# Patient Record
Sex: FEMALE | Race: WHITE
[De-identification: names, ages, dates, MRNs, and addresses within clinical notes are randomized per-mention and may not be internally consistent; named-entity substitution may affect disease eponyms.]

---

## 2020-11-27 ENCOUNTER — TRANSCRIPTION ENCOUNTER (OUTPATIENT)
Age: 60
End: 2020-11-27

## 2021-01-06 ENCOUNTER — TRANSCRIPTION ENCOUNTER (OUTPATIENT)
Age: 61
End: 2021-01-06

## 2022-12-09 PROBLEM — Z00.00 ENCOUNTER FOR PREVENTIVE HEALTH EXAMINATION: Status: ACTIVE | Noted: 2022-12-09

## 2022-12-14 ENCOUNTER — APPOINTMENT (OUTPATIENT)
Dept: ORTHOPEDIC SURGERY | Facility: CLINIC | Age: 62
End: 2022-12-14

## 2022-12-14 VITALS — BODY MASS INDEX: 27.49 KG/M2 | HEIGHT: 65 IN | WEIGHT: 165 LBS

## 2022-12-14 DIAGNOSIS — R26.9 UNSPECIFIED ABNORMALITIES OF GAIT AND MOBILITY: ICD-10-CM

## 2022-12-14 DIAGNOSIS — S91.012A LACERATION W/OUT FOREIGN BODY, LEFT ANKLE, INITIAL ENCOUNTER: ICD-10-CM

## 2022-12-14 DIAGNOSIS — Z78.9 OTHER SPECIFIED HEALTH STATUS: ICD-10-CM

## 2022-12-14 DIAGNOSIS — S90.02XA CONTUSION OF LEFT ANKLE, INITIAL ENCOUNTER: ICD-10-CM

## 2022-12-14 PROCEDURE — 73630 X-RAY EXAM OF FOOT: CPT | Mod: LT

## 2022-12-14 PROCEDURE — 99072 ADDL SUPL MATRL&STAF TM PHE: CPT

## 2022-12-14 PROCEDURE — 73600 X-RAY EXAM OF ANKLE: CPT | Mod: LT

## 2022-12-14 PROCEDURE — 99203 OFFICE O/P NEW LOW 30 MIN: CPT

## 2022-12-14 NOTE — REASON FOR VISIT
[FreeTextEntry2] : left ankle pain. FELL THROUGH CRATE 10/18/2021 IN WORK AT [employer]..SEVERE LACERATION OF SHIN.  HAD WOUND CARE AND ANTIBIOTICS.  DOI 10/18/2021.

## 2022-12-14 NOTE — HISTORY OF PRESENT ILLNESS
[Left Leg] : left leg [Work related] : work related [Sudden] : sudden [8] : 8 [0] : 0 [Intermittent] : intermittent [Rest] : rest [Full time] : Work status: full time [] : no [FreeTextEntry3] : 10/18/2021 [FreeTextEntry5] : stepped onto crate to reach a box and fell through the crate  [FreeTextEntry6] : stretching sensation  [de-identified] :  - [employer]

## 2022-12-14 NOTE — WORK
[Other: ___] : [unfilled] [Was the competent medical cause of the injury] : was the competent medical cause of the injury [Are consistent with the injury] : are consistent with the injury [Consistent with my objective findings] : consistent with my objective findings [Mild Partial] : mild partial [Does not reveal pre-existing condition(s) that may affect treatment/prognosis] : does not reveal pre-existing condition(s) that may affect treatment/prognosis [Patient] : patient [Yes. explain: __________] : Yes: [unfilled] [No Rx restrictions] : No Rx restrictions. [I provided the services listed above] :  I provided the services listed above. [FreeTextEntry1] : excellent [FreeTextEntry3] : s densen

## 2022-12-14 NOTE — ASSESSMENT
[FreeTextEntry1] : I FEEL THAT PT WILL HELP THE PATIENT REDUCE SCAR PAIN AND CONTRACTURE. \par ALSO PT TO STRENGTHEN MUSCLES OF THE LOWER EXTREMITY.

## 2023-01-05 ENCOUNTER — FORM ENCOUNTER (OUTPATIENT)
Age: 63
End: 2023-01-05

## 2023-10-20 ENCOUNTER — OFFICE (OUTPATIENT)
Facility: LOCATION | Age: 63
Setting detail: OPHTHALMOLOGY
End: 2023-10-20
Payer: COMMERCIAL

## 2023-10-20 DIAGNOSIS — H10.433: ICD-10-CM

## 2023-10-20 DIAGNOSIS — H02.831: ICD-10-CM

## 2023-10-20 DIAGNOSIS — H16.253: ICD-10-CM

## 2023-10-20 DIAGNOSIS — H02.834: ICD-10-CM

## 2023-10-20 PROBLEM — H25.13 CATARACT NUCLEAR SCLEROSIS AGE RELATED; BOTH EYES: Status: ACTIVE | Noted: 2023-10-20

## 2023-10-20 PROCEDURE — 99213 OFFICE O/P EST LOW 20 MIN: CPT | Performed by: OPHTHALMOLOGY

## 2023-10-20 ASSESSMENT — CONFRONTATIONAL VISUAL FIELD TEST (CVF)
OS_FINDINGS: FULL
OD_FINDINGS: FULL

## 2023-10-20 ASSESSMENT — LID POSITION - DERMATOCHALASIS
OS_DERMATOCHALASIS: LUL
OD_DERMATOCHALASIS: RUL

## 2023-10-24 ASSESSMENT — VISUAL ACUITY
OD_BCVA: 20/50-2
OS_BCVA: 20/30-2

## 2024-07-10 ENCOUNTER — OFFICE (OUTPATIENT)
Facility: LOCATION | Age: 64
Setting detail: OPHTHALMOLOGY
End: 2024-07-10
Payer: COMMERCIAL

## 2024-07-10 ENCOUNTER — RX ONLY (RX ONLY)
Age: 64
End: 2024-07-10

## 2024-07-10 DIAGNOSIS — H04.563: ICD-10-CM

## 2024-07-10 DIAGNOSIS — H10.433: ICD-10-CM

## 2024-07-10 PROBLEM — H02.831 DERMATOCHALSIS; RIGHT UPPER LID, LEFT UPPER LID: Status: ACTIVE | Noted: 2024-07-10

## 2024-07-10 PROBLEM — H02.834 DERMATOCHALSIS; RIGHT UPPER LID, LEFT UPPER LID: Status: ACTIVE | Noted: 2024-07-10

## 2024-07-10 PROCEDURE — 99213 OFFICE O/P EST LOW 20 MIN: CPT | Performed by: OPHTHALMOLOGY

## 2024-07-10 ASSESSMENT — CONFRONTATIONAL VISUAL FIELD TEST (CVF)
OD_FINDINGS: FULL
OS_FINDINGS: FULL

## 2024-07-10 ASSESSMENT — LID POSITION - DERMATOCHALASIS
OD_DERMATOCHALASIS: RUL
OS_DERMATOCHALASIS: LUL

## 2024-07-12 ENCOUNTER — RX ONLY (RX ONLY)
Age: 64
End: 2024-07-12

## 2024-08-07 ENCOUNTER — OFFICE (OUTPATIENT)
Facility: LOCATION | Age: 64
Setting detail: OPHTHALMOLOGY
End: 2024-08-07
Payer: COMMERCIAL

## 2024-08-07 DIAGNOSIS — H04.563: ICD-10-CM

## 2024-08-07 DIAGNOSIS — H10.433: ICD-10-CM

## 2024-08-07 PROCEDURE — 99213 OFFICE O/P EST LOW 20 MIN: CPT | Performed by: OPHTHALMOLOGY

## 2024-08-07 ASSESSMENT — CONFRONTATIONAL VISUAL FIELD TEST (CVF)
OD_FINDINGS: FULL
OS_FINDINGS: FULL

## 2024-08-07 ASSESSMENT — LID POSITION - DERMATOCHALASIS
OD_DERMATOCHALASIS: RUL
OS_DERMATOCHALASIS: LUL

## 2024-10-09 ENCOUNTER — OFFICE (OUTPATIENT)
Facility: LOCATION | Age: 64
Setting detail: OPHTHALMOLOGY
End: 2024-10-09
Payer: COMMERCIAL

## 2024-10-09 ENCOUNTER — RX ONLY (RX ONLY)
Age: 64
End: 2024-10-09

## 2024-10-09 DIAGNOSIS — H04.563: ICD-10-CM

## 2024-10-09 DIAGNOSIS — H10.45: ICD-10-CM

## 2024-10-09 PROBLEM — H11.151 PINGUECULA; RIGHT EYE: Status: ACTIVE | Noted: 2024-10-09

## 2024-10-09 PROCEDURE — 99213 OFFICE O/P EST LOW 20 MIN: CPT | Performed by: OPHTHALMOLOGY

## 2024-10-09 ASSESSMENT — TONOMETRY
OS_IOP_MMHG: 19
OD_IOP_MMHG: 19

## 2024-10-09 ASSESSMENT — LID POSITION - DERMATOCHALASIS
OS_DERMATOCHALASIS: LUL
OD_DERMATOCHALASIS: RUL

## 2024-10-10 ASSESSMENT — VISUAL ACUITY
OD_BCVA: 20/40+2
OS_BCVA: 20/25-2

## 2025-01-15 ENCOUNTER — OFFICE (OUTPATIENT)
Facility: LOCATION | Age: 65
Setting detail: OPHTHALMOLOGY
End: 2025-01-15
Payer: COMMERCIAL

## 2025-01-15 DIAGNOSIS — H31.29: ICD-10-CM

## 2025-01-15 DIAGNOSIS — H02.834: ICD-10-CM

## 2025-01-15 DIAGNOSIS — H02.831: ICD-10-CM

## 2025-01-15 DIAGNOSIS — H25.13: ICD-10-CM

## 2025-01-15 PROCEDURE — 92014 COMPRE OPH EXAM EST PT 1/>: CPT | Performed by: OPHTHALMOLOGY

## 2025-01-15 ASSESSMENT — LID POSITION - DERMATOCHALASIS
OD_DERMATOCHALASIS: RUL
OS_DERMATOCHALASIS: LUL

## 2025-01-15 ASSESSMENT — CONFRONTATIONAL VISUAL FIELD TEST (CVF)
OS_FINDINGS: FULL
OD_FINDINGS: FULL

## 2025-01-15 ASSESSMENT — TONOMETRY
OD_IOP_MMHG: 20
OS_IOP_MMHG: 20

## 2025-01-15 ASSESSMENT — CORNEAL EDEMA CLINICAL DESCRIPTION: OS_CORNEALEDEMA: T

## 2025-01-21 PROBLEM — H31.29 PERIPAPILLARY ATROPHY: Status: ACTIVE | Noted: 2025-01-15

## 2025-01-21 ASSESSMENT — REFRACTION_CURRENTRX
OS_VPRISM_DIRECTION: SV
OD_AXIS: 162
OD_VPRISM_DIRECTION: SV
OS_CYLINDER: +1.00
OD_CYLINDER: +1.00
OS_SPHERE: +2.50
OS_AXIS: 028
OS_OVR_VA: 20/
OD_SPHERE: +2.75
OD_OVR_VA: 20/

## 2025-01-21 ASSESSMENT — VISUAL ACUITY
OD_BCVA: 20/30+3
OS_BCVA: 20/30+3

## 2025-04-04 ENCOUNTER — OFFICE (OUTPATIENT)
Facility: LOCATION | Age: 65
Setting detail: OPHTHALMOLOGY
End: 2025-04-04
Payer: COMMERCIAL

## 2025-04-04 DIAGNOSIS — H40.043: ICD-10-CM

## 2025-04-04 DIAGNOSIS — H04.563: ICD-10-CM

## 2025-04-04 DIAGNOSIS — H10.45: ICD-10-CM

## 2025-04-04 PROCEDURE — 99213 OFFICE O/P EST LOW 20 MIN: CPT | Performed by: OPHTHALMOLOGY

## 2025-04-04 ASSESSMENT — CONFRONTATIONAL VISUAL FIELD TEST (CVF)
OD_FINDINGS: FULL
OS_FINDINGS: FULL

## 2025-04-04 ASSESSMENT — LID EXAM ASSESSMENTS
OD_MEDIAL_FAT_PROLAPSE: 2+
OS_COMMENTS: 2+ LATERAL FAT PROLAPS
OS_LATERAL_FAT_PROLAPSE: 2+
OD_LATERAL_FAT_PROLAPSE: 2+
OS_PUNCTAL_STENOSIS: 1+
OD_COMMENTS: 2+ LATERAL FAT PROLAPS
OD_CENTRAL_FAT_PROLAPSE: 2+
OS_CENTRAL_FAT_PROLAPSE: 2+
OD_PUNCTAL_STENOSIS: 1+
OS_COMMENTS: 2+ MEDIAL FAT PROLAPSE

## 2025-04-04 ASSESSMENT — LID POSITION - DERMATOCHALASIS
OD_DERMATOCHALASIS: RUL
OS_DERMATOCHALASIS: LUL

## 2025-04-04 ASSESSMENT — CORNEAL EDEMA CLINICAL DESCRIPTION: OS_CORNEALEDEMA: T

## 2025-04-09 ASSESSMENT — REFRACTION_CURRENTRX
OS_VPRISM_DIRECTION: SV
OD_SPHERE: +2.75
OS_OVR_VA: 20/
OD_VPRISM_DIRECTION: SV
OD_CYLINDER: +1.00
OS_AXIS: 028
OS_CYLINDER: +1.00
OD_AXIS: 162
OS_SPHERE: +2.50
OD_OVR_VA: 20/

## 2025-04-09 ASSESSMENT — VISUAL ACUITY
OD_BCVA: 20/40-1
OS_BCVA: 20/30+2

## 2025-04-30 ENCOUNTER — OFFICE (OUTPATIENT)
Facility: LOCATION | Age: 65
Setting detail: OPHTHALMOLOGY
End: 2025-04-30
Payer: COMMERCIAL

## 2025-04-30 DIAGNOSIS — H40.043: ICD-10-CM

## 2025-04-30 DIAGNOSIS — H10.45: ICD-10-CM

## 2025-04-30 DIAGNOSIS — H04.563: ICD-10-CM

## 2025-04-30 PROCEDURE — 99213 OFFICE O/P EST LOW 20 MIN: CPT | Performed by: OPHTHALMOLOGY

## 2025-04-30 ASSESSMENT — TONOMETRY
OD_IOP_MMHG: 19
OS_IOP_MMHG: 19

## 2025-04-30 ASSESSMENT — CONFRONTATIONAL VISUAL FIELD TEST (CVF)
OD_FINDINGS: FULL
OS_FINDINGS: FULL

## 2025-05-01 ASSESSMENT — REFRACTION_CURRENTRX
OS_OVR_VA: 20/
OS_SPHERE: +2.50
OS_VPRISM_DIRECTION: SV
OS_CYLINDER: +1.00
OS_AXIS: 028
OD_CYLINDER: +1.00
OD_AXIS: 162
OD_SPHERE: +2.75
OD_OVR_VA: 20/
OD_VPRISM_DIRECTION: SV

## 2025-05-01 ASSESSMENT — VISUAL ACUITY
OD_BCVA: 20/25-1
OS_BCVA: 20/20-1

## 2025-06-04 ENCOUNTER — OFFICE (OUTPATIENT)
Facility: LOCATION | Age: 65
Setting detail: OPHTHALMOLOGY
End: 2025-06-04
Payer: COMMERCIAL

## 2025-06-04 ENCOUNTER — RX ONLY (RX ONLY)
Age: 65
End: 2025-06-04

## 2025-06-04 DIAGNOSIS — H04.563: ICD-10-CM

## 2025-06-04 DIAGNOSIS — H10.45: ICD-10-CM

## 2025-06-04 DIAGNOSIS — H40.043: ICD-10-CM

## 2025-06-04 PROCEDURE — 99213 OFFICE O/P EST LOW 20 MIN: CPT | Performed by: OPHTHALMOLOGY

## 2025-06-04 ASSESSMENT — VISUAL ACUITY
OS_BCVA: 20/25-1
OD_BCVA: 20/30-1

## 2025-06-04 ASSESSMENT — LID EXAM ASSESSMENTS
OS_COMMENTS: 2+ MEDIAL FAT PROLAPSE
OS_PUNCTAL_STENOSIS: 1+
OS_CENTRAL_FAT_PROLAPSE: 2+
OD_PUNCTAL_STENOSIS: 1+
OD_CENTRAL_FAT_PROLAPSE: 2+
OD_MEDIAL_FAT_PROLAPSE: 2+
OS_LATERAL_FAT_PROLAPSE: 2+
OD_LATERAL_FAT_PROLAPSE: 2+
OS_COMMENTS: 2+ LATERAL FAT PROLAPS
OD_COMMENTS: 2+ LATERAL FAT PROLAPS

## 2025-06-04 ASSESSMENT — REFRACTION_CURRENTRX
OD_SPHERE: +2.75
OD_OVR_VA: 20/
OD_AXIS: 162
OS_CYLINDER: +1.00
OS_SPHERE: +2.50
OD_CYLINDER: +1.00
OS_VPRISM_DIRECTION: SV
OS_AXIS: 028
OD_VPRISM_DIRECTION: SV
OS_OVR_VA: 20/

## 2025-06-04 ASSESSMENT — CONFRONTATIONAL VISUAL FIELD TEST (CVF)
OD_FINDINGS: FULL
OS_FINDINGS: FULL

## 2025-06-04 ASSESSMENT — TONOMETRY
OS_IOP_MMHG: 18
OD_IOP_MMHG: 18

## 2025-06-04 ASSESSMENT — CORNEAL EDEMA CLINICAL DESCRIPTION: OS_CORNEALEDEMA: T

## 2025-06-04 ASSESSMENT — LID POSITION - DERMATOCHALASIS
OS_DERMATOCHALASIS: LUL
OD_DERMATOCHALASIS: RUL

## 2025-07-11 ENCOUNTER — OFFICE (OUTPATIENT)
Facility: LOCATION | Age: 65
Setting detail: OPHTHALMOLOGY
End: 2025-07-11
Payer: COMMERCIAL

## 2025-07-11 DIAGNOSIS — H40.043: ICD-10-CM

## 2025-07-11 DIAGNOSIS — H04.563: ICD-10-CM

## 2025-07-11 DIAGNOSIS — H10.45: ICD-10-CM

## 2025-07-11 PROCEDURE — 99213 OFFICE O/P EST LOW 20 MIN: CPT | Performed by: OPHTHALMOLOGY

## 2025-07-11 ASSESSMENT — REFRACTION_CURRENTRX
OS_OVR_VA: 20/
OD_AXIS: 162
OS_AXIS: 028
OD_VPRISM_DIRECTION: SV
OD_CYLINDER: +1.00
OS_CYLINDER: +1.00
OS_SPHERE: +2.50
OD_OVR_VA: 20/
OS_VPRISM_DIRECTION: SV
OD_SPHERE: +2.75

## 2025-07-11 ASSESSMENT — CONFRONTATIONAL VISUAL FIELD TEST (CVF)
OD_FINDINGS: FULL
OS_FINDINGS: FULL

## 2025-07-11 ASSESSMENT — VISUAL ACUITY
OS_BCVA: 20/20
OD_BCVA: 20/30

## 2025-07-11 ASSESSMENT — LID EXAM ASSESSMENTS
OD_PUNCTAL_STENOSIS: 1+
OS_PUNCTAL_STENOSIS: 1+
OD_CENTRAL_FAT_PROLAPSE: 2+
OD_LATERAL_FAT_PROLAPSE: 2+
OS_COMMENTS: 2+ MEDIAL FAT PROLAPSE
OD_MEDIAL_FAT_PROLAPSE: 2+
OS_COMMENTS: 2+ LATERAL FAT PROLAPS
OD_COMMENTS: 2+ LATERAL FAT PROLAPS
OS_CENTRAL_FAT_PROLAPSE: 2+
OS_LATERAL_FAT_PROLAPSE: 2+

## 2025-07-11 ASSESSMENT — LID POSITION - DERMATOCHALASIS
OS_DERMATOCHALASIS: LUL
OD_DERMATOCHALASIS: RUL

## 2025-07-11 ASSESSMENT — TONOMETRY
OS_IOP_MMHG: 17
OD_IOP_MMHG: 17

## 2025-07-11 ASSESSMENT — CORNEAL EDEMA CLINICAL DESCRIPTION: OS_CORNEALEDEMA: T

## 2025-07-25 ENCOUNTER — RX ONLY (RX ONLY)
Age: 65
End: 2025-07-25

## 2025-07-25 ENCOUNTER — OFFICE (OUTPATIENT)
Facility: LOCATION | Age: 65
Setting detail: OPHTHALMOLOGY
End: 2025-07-25
Payer: COMMERCIAL

## 2025-07-25 DIAGNOSIS — H10.45: ICD-10-CM

## 2025-07-25 DIAGNOSIS — H40.043: ICD-10-CM

## 2025-07-25 DIAGNOSIS — H04.563: ICD-10-CM

## 2025-07-25 PROCEDURE — 99213 OFFICE O/P EST LOW 20 MIN: CPT | Performed by: OPHTHALMOLOGY

## 2025-07-25 ASSESSMENT — VISUAL ACUITY
OD_BCVA: 20/30
OS_BCVA: 20/25+2

## 2025-07-25 ASSESSMENT — REFRACTION_CURRENTRX
OS_AXIS: 028
OS_SPHERE: +2.50
OS_OVR_VA: 20/
OD_CYLINDER: +1.00
OS_CYLINDER: +1.00
OD_AXIS: 162
OD_VPRISM_DIRECTION: SV
OS_VPRISM_DIRECTION: SV
OD_OVR_VA: 20/
OD_SPHERE: +2.75

## 2025-07-25 ASSESSMENT — LID POSITION - DERMATOCHALASIS
OS_DERMATOCHALASIS: LUL
OD_DERMATOCHALASIS: RUL

## 2025-07-25 ASSESSMENT — LID EXAM ASSESSMENTS
OS_PUNCTAL_STENOSIS: 1+
OS_CENTRAL_FAT_PROLAPSE: 2+
OD_COMMENTS: 2+ LATERAL FAT PROLAPS
OS_COMMENTS: 2+ LATERAL FAT PROLAPS
OS_LATERAL_FAT_PROLAPSE: 2+
OD_MEDIAL_FAT_PROLAPSE: 2+
OS_COMMENTS: 2+ MEDIAL FAT PROLAPSE
OD_LATERAL_FAT_PROLAPSE: 2+
OD_PUNCTAL_STENOSIS: 1+
OD_CENTRAL_FAT_PROLAPSE: 2+

## 2025-07-25 ASSESSMENT — TONOMETRY
OD_IOP_MMHG: 18
OS_IOP_MMHG: 18

## 2025-07-25 ASSESSMENT — CORNEAL EDEMA CLINICAL DESCRIPTION: OS_CORNEALEDEMA: T
